# Patient Record
Sex: MALE | Race: BLACK OR AFRICAN AMERICAN | Employment: UNEMPLOYED | URBAN - METROPOLITAN AREA
[De-identification: names, ages, dates, MRNs, and addresses within clinical notes are randomized per-mention and may not be internally consistent; named-entity substitution may affect disease eponyms.]

---

## 2021-10-16 ENCOUNTER — HOSPITAL ENCOUNTER (EMERGENCY)
Age: 71
Discharge: HOME | End: 2021-10-16
Attending: EMERGENCY MEDICINE

## 2021-10-16 VITALS
DIASTOLIC BLOOD PRESSURE: 76 MMHG | HEIGHT: 72 IN | TEMPERATURE: 99 F | HEART RATE: 110 BPM | OXYGEN SATURATION: 95 % | RESPIRATION RATE: 20 BRPM | SYSTOLIC BLOOD PRESSURE: 152 MMHG | WEIGHT: 242.51 LBS | BODY MASS INDEX: 32.85 KG/M2

## 2021-10-16 DIAGNOSIS — T40.1X1A ACCIDENTAL OVERDOSE OF HEROIN, INITIAL ENCOUNTER (HCC): Primary | ICD-10-CM

## 2021-10-16 PROCEDURE — 99284 EMERGENCY DEPT VISIT MOD MDM: CPT

## 2021-10-16 RX ORDER — NALOXONE HYDROCHLORIDE 4 MG/.1ML
SPRAY NASAL
Qty: 1 EACH | Refills: 3 | Status: SHIPPED | OUTPATIENT
Start: 2021-10-16

## 2021-10-16 NOTE — DISCHARGE INSTRUCTIONS
[facility] in [address] Fax: [phone], phone: [phone]    [place] Fax: [phone], phone: [phone]    Right [address]:   [place] - [address]  Gracie Hodgkin- [address]- [address] - [phone]     [address]-  Fax: [phone], phone [phone] or [phone]    [facility]- [phone]

## 2021-10-16 NOTE — ED PROVIDER NOTES
EMERGENCY DEPARTMENT HISTORY AND PHYSICAL EXAM    7:27 PM      Date: 10/16/2021  Patient Name: Eriberto Meyers    History of Presenting Illness     Chief Complaint   Patient presents with    Drug Overdose         History Provided By: Patient  Location/Duration/Severity/Modifying factors   Patient is a 69-year-old male with a history of type 2 diabetes and hypertension that presents emergency department with complaint of altered mental status. The patient was at a car wash and use to half a cap of heroin and started to have a staring episode and there was concern he may be having a stroke. The patient in the emergency department was given Narcan by EMS and then became alert and oriented. The patient provides a history stating that he has been using 1-2 caps of heroin a day for for at least many months and has some withdrawal symptoms when he does not. The patient has neuropathy in his lower extremities and says the heroin ceiling way that makes it better. The patient's been hiding this from his family and called his brother tonight and his brother wants him to get clean. The patient says he uses heroin only intranasally. Patient denies any chest pain or shortness of breath. Patient says he feels fine at this time. Patient denies any other aggravating or alleviating factors. Patient denies being a smoker, drinker, nor drug user. PCP: Paloma Guo, Not On File, MD        Past History     Past Medical History:  No past medical history on file. Past Surgical History:  No past surgical history on file. Family History:  No family history on file. Social History:  Social History     Tobacco Use    Smoking status: Not on file   Substance Use Topics    Alcohol use: Not on file    Drug use: Not on file       Allergies:  No Known Allergies      Review of Systems       Review of Systems   Constitutional: Negative for activity change, fatigue and fever. HENT: Negative for congestion and rhinorrhea. Eyes: Negative for visual disturbance. Respiratory: Negative for shortness of breath. Cardiovascular: Negative for chest pain and palpitations. Gastrointestinal: Negative for abdominal pain, diarrhea, nausea and vomiting. Genitourinary: Negative for dysuria and hematuria. Musculoskeletal: Negative for back pain. Skin: Negative for rash. Neurological: Negative for dizziness, weakness and light-headedness. All other systems reviewed and are negative. Physical Exam     Visit Vitals  BP (!) 182/92 (BP 1 Location: Right arm, BP Patient Position: At rest)   Pulse (!) 110   Temp 99 °F (37.2 °C)   Resp 20   Ht 6' (1.829 m)   Wt 110 kg (242 lb 8.1 oz)   SpO2 98%   BMI 32.89 kg/m²         Physical Exam  Vitals and nursing note reviewed. Constitutional:       General: He is not in acute distress. Appearance: He is well-developed. HENT:      Head: Normocephalic and atraumatic. Right Ear: External ear normal.      Left Ear: External ear normal.      Nose: Nose normal.   Eyes:      General: No scleral icterus. Conjunctiva/sclera: Conjunctivae normal.      Pupils: Pupils are equal, round, and reactive to light. Neck:      Thyroid: No thyromegaly. Vascular: No JVD. Trachea: No tracheal deviation. Cardiovascular:      Rate and Rhythm: Normal rate and regular rhythm. Heart sounds: Normal heart sounds. No murmur heard. No friction rub. No gallop. Pulmonary:      Effort: Pulmonary effort is normal.      Breath sounds: Normal breath sounds. Chest:      Chest wall: No tenderness. Abdominal:      General: Bowel sounds are normal. There is no distension. Palpations: Abdomen is soft. Tenderness: There is no abdominal tenderness. There is no guarding or rebound. Musculoskeletal:         General: No tenderness. Normal range of motion. Cervical back: Normal range of motion and neck supple.       Comments: Trace edema bilaterally   Lymphadenopathy: Cervical: No cervical adenopathy. Skin:     General: Skin is warm and dry. Neurological:      Mental Status: He is alert and oriented to person, place, and time. Cranial Nerves: No cranial nerve deficit. Coordination: Coordination normal.      Comments: No sensory loss, Gait normal, Motor 5/5   Psychiatric:         Behavior: Behavior normal.         Thought Content: Thought content normal.         Judgment: Judgment normal.           Diagnostic Study Results     Labs -  No results found for this or any previous visit (from the past 12 hour(s)). Radiologic Studies -   No orders to display         Medical Decision Making   I am the first provider for this patient. I reviewed the vital signs, available nursing notes, past medical history, past surgical history, family history and social history. Vital Signs-Reviewed the patient's vital signs. Records Reviewed: Nursing Notes, Old Medical Records, Previous Radiology Studies and Previous Laboratory Studies (Time of Review: 7:27 PM)    ED Course: Progress Notes, Reevaluation, and Consults: The patient remains alert and oriented without any hypoxia and the patient is safe for discharge. Patient was given information on recovery as well as contact information for Peer recovery support. In addition I have called in Narcan for the patient to have available in case he should have an overdose. Workup and recommendations were reviewed with the patient and all questions were answered. The patient understands the plan and will proceed with close outpatient care. I have encouraged the patient to return if at all worsened or concerned. Sammi Fowler DO 7:47 PM      Provider Notes (Medical Decision Making):   MDM  Number of Diagnoses or Management Options  Diagnosis management comments:  The patient is a 79-year-old male with a history of diabetes and hypertension with no neuropathy that presents with altered mental status that resolved with Narcan. The patient admitted to using heroin and said he is noticed recently that he is having a staring episode when he uses and this time this happened he was at the car wash. The patient has no symptoms at this time we would like to observe the patient for 2 hours for any evidence of resedation. If none will refer the patient for medication assisted therapy for Suboxone initiation and also give the patient information regarding peer recovery. Do not feel further medical work-up is needed at this time and will continue to evaluate the patient. Procedures      Diagnosis     Clinical Impression:   1. Accidental overdose of heroin, initial encounter [facility])        Disposition: [place]    Follow-up Information     Follow up With Specialties Details Why [address]  In 2 days  [address]    Peer Recovery Support [phone]   As needed, If symptoms worsen     [facility] EMERGENCY DEPT Emergency Medicine   [address]  [phone]           Patient's Medications   Start Taking    NALOXONE [facility]) 4 MG/ACTUATION NASAL SPRAY    Use 1 spray intranasally, then discard. Repeat with new spray every 2 min as needed for opioid overdose symptoms, alternating nostrils. Continue Taking    No medications on file   These Medications have changed    No medications on file   Stop Taking    No medications on file     Disclaimer: Sections of this note are dictated using utilizing voice recognition software. Minor typographical errors may be present. If questions arise, please do not hesitate to contact me or call our department.

## 2021-10-17 NOTE — ED NOTES
I have reviewed discharge instructions with the patient. The patient verbalized understanding of the instructions and the importance of scheduling and attending follow up appointments as well as the importance fo taking medications as prescribed. The patient ambulated out of the ED without assistance and in stable condition.

## 2023-05-21 RX ORDER — NALOXONE HYDROCHLORIDE 4 MG/.1ML
SPRAY NASAL
COMMUNITY
Start: 2021-10-16